# Patient Record
Sex: MALE | Race: BLACK OR AFRICAN AMERICAN | NOT HISPANIC OR LATINO | URBAN - METROPOLITAN AREA
[De-identification: names, ages, dates, MRNs, and addresses within clinical notes are randomized per-mention and may not be internally consistent; named-entity substitution may affect disease eponyms.]

---

## 2019-01-29 ENCOUNTER — APPOINTMENT (OUTPATIENT)
Dept: LAB | Facility: HOSPITAL | Age: 64
End: 2019-01-29
Attending: INTERNAL MEDICINE
Payer: COMMERCIAL

## 2019-01-29 ENCOUNTER — OFFICE VISIT (OUTPATIENT)
Dept: PRIMARY CARE | Facility: CLINIC | Age: 64
End: 2019-01-29
Payer: COMMERCIAL

## 2019-01-29 VITALS
SYSTOLIC BLOOD PRESSURE: 128 MMHG | TEMPERATURE: 98.6 F | HEART RATE: 78 BPM | OXYGEN SATURATION: 98 % | HEIGHT: 67 IN | DIASTOLIC BLOOD PRESSURE: 88 MMHG | WEIGHT: 188 LBS | BODY MASS INDEX: 29.51 KG/M2

## 2019-01-29 DIAGNOSIS — H35.50 HEREDITARY MACULAR DYSTROPHY: ICD-10-CM

## 2019-01-29 DIAGNOSIS — R35.1 BENIGN PROSTATIC HYPERPLASIA WITH NOCTURIA: ICD-10-CM

## 2019-01-29 DIAGNOSIS — Z00.00 ROUTINE GENERAL MEDICAL EXAMINATION AT A HEALTH CARE FACILITY: Primary | ICD-10-CM

## 2019-01-29 DIAGNOSIS — H52.13 MYOPIA OF BOTH EYES: ICD-10-CM

## 2019-01-29 DIAGNOSIS — N40.1 BENIGN PROSTATIC HYPERPLASIA WITH NOCTURIA: ICD-10-CM

## 2019-01-29 DIAGNOSIS — J30.1 SEASONAL ALLERGIC RHINITIS DUE TO POLLEN: ICD-10-CM

## 2019-01-29 DIAGNOSIS — Z00.00 ANNUAL PHYSICAL EXAM: Primary | ICD-10-CM

## 2019-01-29 PROBLEM — D17.1 LIPOMA OF BACK: Status: ACTIVE | Noted: 2019-01-29

## 2019-01-29 LAB
ALBUMIN SERPL-MCNC: 4.4 G/DL (ref 3.4–5)
ALP SERPL-CCNC: 54 IU/L (ref 35–126)
ALT SERPL-CCNC: 14 IU/L (ref 16–63)
ANION GAP SERPL CALC-SCNC: 9 MEQ/L (ref 3–15)
AST SERPL-CCNC: 16 IU/L (ref 15–41)
BILIRUB SERPL-MCNC: 0.6 MG/DL (ref 0.3–1.2)
BUN SERPL-MCNC: 15 MG/DL (ref 8–20)
CALCIUM SERPL-MCNC: 9.4 MG/DL (ref 8.9–10.3)
CHLORIDE SERPL-SCNC: 104 MEQ/L (ref 98–109)
CHOLEST SERPL-MCNC: 188 MG/DL
CO2 SERPL-SCNC: 26 MEQ/L (ref 22–32)
CREAT SERPL-MCNC: 1 MG/DL
ERYTHROCYTE [DISTWIDTH] IN BLOOD BY AUTOMATED COUNT: 13.2 % (ref 11.6–14.4)
GFR SERPL CREATININE-BSD FRML MDRD: >60 ML/MIN/1.73M*2
GLUCOSE SERPL-MCNC: 123 MG/DL (ref 70–99)
HCT VFR BLDCO AUTO: 41 %
HDLC SERPL-MCNC: 39 MG/DL
HDLC SERPL: 4.8 {RATIO}
HGB BLD-MCNC: 13.5 G/DL
LDLC SERPL CALC-MCNC: 123 MG/DL
MCH RBC QN AUTO: 27.4 PG (ref 28–33.2)
MCHC RBC AUTO-ENTMCNC: 32.9 G/DL (ref 32.2–36.5)
MCV RBC AUTO: 83.3 FL (ref 83–98)
NONHDLC SERPL-MCNC: 149 MG/DL
PDW BLD AUTO: 11.4 FL (ref 9.4–12.4)
PLATELET # BLD AUTO: 243 K/UL
POTASSIUM SERPL-SCNC: 4.6 MEQ/L (ref 3.6–5.1)
PROT SERPL-MCNC: 7 G/DL (ref 6–8.2)
RBC # BLD AUTO: 4.92 M/UL (ref 4.5–5.8)
SODIUM SERPL-SCNC: 139 MEQ/L (ref 136–144)
TRIGL SERPL-MCNC: 132 MG/DL (ref 30–149)
WBC # BLD AUTO: 8.22 K/UL

## 2019-01-29 PROCEDURE — 93000 ELECTROCARDIOGRAM COMPLETE: CPT | Performed by: INTERNAL MEDICINE

## 2019-01-29 PROCEDURE — 36415 COLL VENOUS BLD VENIPUNCTURE: CPT

## 2019-01-29 PROCEDURE — 80053 COMPREHEN METABOLIC PANEL: CPT

## 2019-01-29 PROCEDURE — 83036 HEMOGLOBIN GLYCOSYLATED A1C: CPT

## 2019-01-29 PROCEDURE — 99386 PREV VISIT NEW AGE 40-64: CPT | Performed by: INTERNAL MEDICINE

## 2019-01-29 PROCEDURE — 85025 COMPLETE CBC W/AUTO DIFF WBC: CPT

## 2019-01-29 PROCEDURE — 80061 LIPID PANEL: CPT

## 2019-01-29 RX ORDER — TAMSULOSIN HYDROCHLORIDE 0.4 MG/1
CAPSULE ORAL
Refills: 11 | COMMUNITY
Start: 2018-10-25

## 2019-01-29 RX ORDER — FINASTERIDE 5 MG/1
5 TABLET, FILM COATED ORAL DAILY
COMMUNITY

## 2019-01-29 RX ORDER — FLUTICASONE PROPIONATE 50 MCG
1 SPRAY, SUSPENSION (ML) NASAL DAILY
COMMUNITY

## 2019-01-29 RX ORDER — LUTEIN 6 MG
TABLET ORAL
COMMUNITY

## 2019-01-29 ASSESSMENT — ENCOUNTER SYMPTOMS
ABDOMINAL PAIN: 0
LIGHT-HEADEDNESS: 0
DIARRHEA: 0
FATIGUE: 0
CHILLS: 0
NUMBNESS: 0
CONFUSION: 0
FEVER: 0
NAUSEA: 0
VOMITING: 0
HEADACHES: 0
BACK PAIN: 0
DYSURIA: 0
WEAKNESS: 0
SHORTNESS OF BREATH: 0
CONSTIPATION: 0
DIZZINESS: 0
EYE PAIN: 0
TREMORS: 0
SORE THROAT: 0

## 2019-01-29 NOTE — PROGRESS NOTES
"  Subjective     Patient ID: Kvng Garcia is a 63 y.o. male.    Here for physical exam.        Review of Systems   Constitutional: Negative for chills, fatigue and fever.   HENT: Negative for sore throat.    Eyes: Negative for pain.   Respiratory: Negative for shortness of breath.    Cardiovascular: Negative for chest pain.   Gastrointestinal: Negative for abdominal pain, constipation, diarrhea, nausea and vomiting.   Genitourinary: Negative for dysuria.   Musculoskeletal: Negative for back pain.   Skin: Negative for rash.   Neurological: Negative for dizziness, tremors, weakness, light-headedness, numbness and headaches.   Psychiatric/Behavioral: Negative for confusion.       Objective     Vitals:    01/29/19 0919   BP: 128/88   BP Location: Left upper arm   Patient Position: Sitting   Pulse: 78   Temp: 37 °C (98.6 °F)   TempSrc: Oral   SpO2: 98%   Weight: 85.3 kg (188 lb)   Height: 1.702 m (5' 7\")     Body mass index is 29.44 kg/m².    Physical Exam   Constitutional: He is oriented to person, place, and time. He appears well-developed and well-nourished. He is active.   HENT:   Head: Normocephalic and atraumatic.   Right Ear: Tympanic membrane normal.   Left Ear: Tympanic membrane normal.   Nose: Nose normal.   Mouth/Throat: Oropharynx is clear and moist and mucous membranes are normal.   Eyes: EOM are normal. Pupils are equal, round, and reactive to light.   Neck: Trachea normal and normal range of motion. Neck supple. Carotid bruit is not present. No thyroid mass present.   Cardiovascular: Normal rate, regular rhythm, normal heart sounds and intact distal pulses.    Pulmonary/Chest: Effort normal and breath sounds normal. He has no wheezes. He has no rales.   Abdominal: Soft. Bowel sounds are normal. He exhibits no distension. There is no tenderness.   Genitourinary:   Genitourinary Comments: Pt had JUSTIN w  9/2018   Musculoskeletal: Normal range of motion. He exhibits no edema, tenderness or deformity. "   Neurological: He is alert and oriented to person, place, and time. He displays normal reflexes. No cranial nerve deficit.   Skin: Skin is warm and dry. No rash noted. No erythema.   Approx 4 cm soft moveable SQ mass left upper back.    Psychiatric: He has a normal mood and affect. His behavior is normal. Judgment and thought content normal.   Nursing note and vitals reviewed.      Assessment/Plan   Problem List Items Addressed This Visit     Hereditary macular dystrophy     Noted. Left eye. Cont f/u optho, as doing.          Myopia     Noted. Left eye. Cont f/u optho, as doing.          Benign prostatic hyperplasia with nocturia     Moderate. Cont current meds. Minimize fluids at hs         Relevant Medications    tamsulosin (FLOMAX) 0.4 mg capsule    finasteride (PROSCAR) 5 mg tablet    Seasonal allergic rhinitis due to pollen     Mild. Flonase seasonally. Rx prn.          Relevant Orders    Comprehensive metabolic panel      Other Visit Diagnoses     Annual physical exam    -  Primary    Relevant Orders    ECG 12 LEAD OFFICE PERFORMED (Completed)    CBC    Lipid panel    Direct Access Colonoscopy [facility]

## 2019-01-30 ENCOUNTER — TELEPHONE (OUTPATIENT)
Dept: PRIMARY CARE | Facility: CLINIC | Age: 64
End: 2019-01-30

## 2019-01-30 LAB
EST. AVERAGE GLUCOSE BLD GHB EST-MCNC: 151 MG/DL
HBA1C MFR BLD HPLC: 6.9 %

## 2019-01-31 ENCOUNTER — TELEPHONE (OUTPATIENT)
Dept: PRIMARY CARE | Facility: CLINIC | Age: 64
End: 2019-01-31

## 2019-01-31 NOTE — TELEPHONE ENCOUNTER
----- Message from Moy العلي MD sent at 1/31/2019  8:39 AM EST -----  A1C is high. C/w Dx of DM. Come in to discuss.

## 2019-02-07 ENCOUNTER — OFFICE VISIT (OUTPATIENT)
Dept: PRIMARY CARE | Facility: CLINIC | Age: 64
End: 2019-02-07
Payer: COMMERCIAL

## 2019-02-07 VITALS
TEMPERATURE: 99 F | HEIGHT: 67 IN | WEIGHT: 185 LBS | BODY MASS INDEX: 29.03 KG/M2 | OXYGEN SATURATION: 98 % | HEART RATE: 72 BPM | DIASTOLIC BLOOD PRESSURE: 80 MMHG | SYSTOLIC BLOOD PRESSURE: 140 MMHG | RESPIRATION RATE: 17 BRPM

## 2019-02-07 DIAGNOSIS — H52.13 MYOPIA OF BOTH EYES: ICD-10-CM

## 2019-02-07 DIAGNOSIS — R35.1 BENIGN PROSTATIC HYPERPLASIA WITH NOCTURIA: ICD-10-CM

## 2019-02-07 DIAGNOSIS — N40.1 BENIGN PROSTATIC HYPERPLASIA WITH NOCTURIA: ICD-10-CM

## 2019-02-07 DIAGNOSIS — E11.9 TYPE 2 DIABETES MELLITUS WITHOUT COMPLICATION, WITHOUT LONG-TERM CURRENT USE OF INSULIN (CMS/HCC): Primary | ICD-10-CM

## 2019-02-07 PROCEDURE — 99214 OFFICE O/P EST MOD 30 MIN: CPT | Performed by: INTERNAL MEDICINE

## 2019-02-07 ASSESSMENT — ENCOUNTER SYMPTOMS
HEADACHES: 0
ABDOMINAL PAIN: 0
SHORTNESS OF BREATH: 0
FATIGUE: 0

## 2019-02-07 NOTE — ASSESSMENT & PLAN NOTE
New Dx. A1C 6.9 1/29/19.   Diet discussed at length.  Needs to exercise.   Doesn't want meds at this time. Wants to try to eat right and exercise and recheck labs in 3 mos. If still high, t/c meds.

## 2019-02-07 NOTE — PROGRESS NOTES
"  Subjective     Patient ID: Kvng Garcia is a 63 y.o. male.    DM2: new diagnosis. A1C 6.9 on 1/29/19.   Denies increased urination.   Denies change vision. Saw optho last year. Has another appt 4/2019.  Diet: fair. Candy, soda occasionally.   Exercise: none          Review of Systems   Constitutional: Negative for fatigue.   Respiratory: Negative for shortness of breath.    Cardiovascular: Negative for chest pain.   Gastrointestinal: Negative for abdominal pain.   Neurological: Negative for headaches.       Objective     Vitals:    02/07/19 1206   BP: 140/80   BP Location: Left upper arm   Patient Position: Sitting   Pulse: 72   Resp: 17   Temp: 37.2 °C (99 °F)   SpO2: 98%   Weight: 83.9 kg (185 lb)   Height: 1.702 m (5' 7\")     Body mass index is 28.98 kg/m².    Physical Exam   Constitutional: He is oriented to person, place, and time. He appears well-developed and well-nourished. No distress.   Cardiovascular: Normal rate, regular rhythm, normal heart sounds and intact distal pulses.    No murmur heard.  Pulmonary/Chest: Effort normal and breath sounds normal. No respiratory distress. He has no wheezes. He has no rales.   Abdominal: Soft. Bowel sounds are normal. He exhibits no distension. There is no tenderness.   Neurological: He is alert and oriented to person, place, and time.   Skin: Skin is warm and dry. He is not diaphoretic.       Assessment/Plan   Problem List Items Addressed This Visit     Myopia     Noted. Cont f/u optho for current eye problems and for Dx of DM. Pt aware.          Benign prostatic hyperplasia with nocturia     Stable.  Continue current medications.         Type 2 diabetes mellitus without complication, without long-term current use of insulin (CMS/[facility]) - Primary     New Dx. A1C 6.9 1/29/19.   Diet discussed at length.  Needs to exercise.   Doesn't want meds at this time. Wants to try to eat right and exercise and recheck labs in 3 mos. If still high, t/c meds.          Relevant " Orders    Basic metabolic panel    Hemoglobin A1c    Microalbumin/Creatinine Ur Random

## 2019-07-30 ENCOUNTER — OFFICE VISIT (OUTPATIENT)
Dept: PRIMARY CARE | Facility: CLINIC | Age: 64
End: 2019-07-30
Payer: COMMERCIAL

## 2019-07-30 ENCOUNTER — APPOINTMENT (OUTPATIENT)
Dept: LAB | Facility: HOSPITAL | Age: 64
End: 2019-07-30
Attending: INTERNAL MEDICINE
Payer: COMMERCIAL

## 2019-07-30 ENCOUNTER — TELEPHONE (OUTPATIENT)
Dept: PRIMARY CARE | Facility: CLINIC | Age: 64
End: 2019-07-30

## 2019-07-30 VITALS
DIASTOLIC BLOOD PRESSURE: 80 MMHG | HEART RATE: 75 BPM | BODY MASS INDEX: 29 KG/M2 | SYSTOLIC BLOOD PRESSURE: 118 MMHG | OXYGEN SATURATION: 98 % | RESPIRATION RATE: 14 BRPM | HEIGHT: 67 IN | WEIGHT: 184.8 LBS | TEMPERATURE: 98 F

## 2019-07-30 DIAGNOSIS — J30.1 SEASONAL ALLERGIC RHINITIS DUE TO POLLEN: ICD-10-CM

## 2019-07-30 DIAGNOSIS — N40.1 BENIGN PROSTATIC HYPERPLASIA WITH NOCTURIA: ICD-10-CM

## 2019-07-30 DIAGNOSIS — E11.9 TYPE 2 DIABETES MELLITUS WITHOUT COMPLICATION, WITHOUT LONG-TERM CURRENT USE OF INSULIN (CMS/HCC): Primary | ICD-10-CM

## 2019-07-30 DIAGNOSIS — E11.9 TYPE 2 DIABETES MELLITUS WITHOUT COMPLICATION, WITHOUT LONG-TERM CURRENT USE OF INSULIN (CMS/HCC): ICD-10-CM

## 2019-07-30 DIAGNOSIS — R35.1 BENIGN PROSTATIC HYPERPLASIA WITH NOCTURIA: ICD-10-CM

## 2019-07-30 LAB
ANION GAP SERPL CALC-SCNC: 7 MEQ/L (ref 3–15)
BUN SERPL-MCNC: 19 MG/DL (ref 8–20)
CALCIUM SERPL-MCNC: 9.2 MG/DL (ref 8.9–10.3)
CHLORIDE SERPL-SCNC: 108 MEQ/L (ref 98–109)
CO2 SERPL-SCNC: 26 MEQ/L (ref 22–32)
CREAT SERPL-MCNC: 1.2 MG/DL
EST. AVERAGE GLUCOSE BLD GHB EST-MCNC: 146 MG/DL
GFR SERPL CREATININE-BSD FRML MDRD: >60 ML/MIN/1.73M*2
GLUCOSE SERPL-MCNC: 114 MG/DL (ref 70–99)
HBA1C MFR BLD HPLC: 6.7 %
HCV AB SER QL: NONREACTIVE
POTASSIUM SERPL-SCNC: 4.4 MEQ/L (ref 3.6–5.1)
SODIUM SERPL-SCNC: 141 MEQ/L (ref 136–144)

## 2019-07-30 PROCEDURE — 83036 HEMOGLOBIN GLYCOSYLATED A1C: CPT

## 2019-07-30 PROCEDURE — 99214 OFFICE O/P EST MOD 30 MIN: CPT | Performed by: INTERNAL MEDICINE

## 2019-07-30 PROCEDURE — 82310 ASSAY OF CALCIUM: CPT

## 2019-07-30 PROCEDURE — 86803 HEPATITIS C AB TEST: CPT

## 2019-07-30 PROCEDURE — 36415 COLL VENOUS BLD VENIPUNCTURE: CPT

## 2019-07-30 ASSESSMENT — ENCOUNTER SYMPTOMS
DIABETIC ASSOCIATED SYMPTOMS: 0
ABDOMINAL PAIN: 0
SHORTNESS OF BREATH: 0
HEADACHES: 0
FATIGUE: 0

## 2019-07-30 NOTE — TELEPHONE ENCOUNTER
Called to inform patient of negative lab results no answer.  Per Office PHI Left message with results and advised patient to give office a call back with any questions or concerns. Marylin Coleman MA    ----- Message from Moy العلي MD sent at 7/30/2019  2:13 PM EDT -----  Negative.

## 2019-07-30 NOTE — PROGRESS NOTES
A Bit better, but still high. Diet and exercise.   Pt doesn't want meds but may need medication, as we discussed.   Recheck A1C 3-6 months.

## 2019-07-30 NOTE — ASSESSMENT & PLAN NOTE
Pt doing better with diet. Exercising regularly.   Check labs and proceed accordingly.   Doesn't want meds up to this point.

## 2019-07-30 NOTE — PROGRESS NOTES
"  Subjective     Patient ID: Kvng Garcia is a 64 y.o. male.    Diet: Changed diet. \"I don't eat any junk. I don't eat candy\". Increased water.  Exercise: Walking, cut grass. Went to gym a few times. Bought treadmill and has been using it about 2x/week.       Diabetes   He presents for his follow-up diabetic visit. He has type 2 diabetes mellitus. His disease course has been stable. There are no hypoglycemic associated symptoms. Pertinent negatives for hypoglycemia include no headaches. There are no diabetic associated symptoms. Pertinent negatives for diabetes include no chest pain and no fatigue. Current diabetic treatment includes diet.       Review of Systems   Constitutional: Negative for fatigue.   Respiratory: Negative for shortness of breath.    Cardiovascular: Negative for chest pain.   Gastrointestinal: Negative for abdominal pain.   Neurological: Negative for headaches.       Objective     Vitals:    07/30/19 0814   BP: 118/80   BP Location: Right upper arm   Patient Position: Sitting   Pulse: 75   Resp: 14   Temp: 36.7 °C (98 °F)   TempSrc: Oral   SpO2: 98%   Weight: 83.8 kg (184 lb 12.8 oz)   Height: 1.702 m (5' 7\")     Body mass index is 28.94 kg/m².    Physical Exam   Constitutional: He is oriented to person, place, and time. He appears well-developed and well-nourished. No distress.   Cardiovascular: Normal rate, regular rhythm, normal heart sounds and intact distal pulses.    No murmur heard.  Pulmonary/Chest: Effort normal and breath sounds normal. No respiratory distress. He has no wheezes. He has no rales.   Abdominal: Soft. Bowel sounds are normal. He exhibits no distension. There is no tenderness.   Neurological: He is alert and oriented to person, place, and time.   Skin: Skin is warm and dry. He is not diaphoretic.       Assessment/Plan   Diagnoses and all orders for this visit:    Type 2 diabetes mellitus without complication, without long-term current use of insulin (CMS/[facility]) " (Primary)  Assessment & Plan:  Pt doing better with diet. Exercising regularly.   Check labs and proceed accordingly.   Doesn't want meds up to this point.     Orders:  -     Basic metabolic panel; Future  -     Hemoglobin A1c; Future  -     Hepatitis C antibody; Future    Benign prostatic hyperplasia with nocturia  Assessment & Plan:  Stable.  Continue current medications.  Pt notes he is seeing  next week for JUSTIN.       Seasonal allergic rhinitis due to pollen  Assessment & Plan:  Stable. Mild.   Continue current medications.

## 2019-07-30 NOTE — TELEPHONE ENCOUNTER
Called to inform patient of results no answer left message for patient to give office a call back. Marylin Coleman MA    ----- Message from Moy العلي MD sent at 7/30/2019 11:16 AM EDT -----  A Bit better, but still high. Diet and exercise.   Pt doesn't want meds but may need medication, as we discussed.   Recheck A1C 3-6 months.

## 2019-07-31 ENCOUNTER — PATIENT OUTREACH (OUTPATIENT)
Dept: PRIMARY CARE | Facility: CLINIC | Age: 64
End: 2019-07-31

## 2019-07-31 DIAGNOSIS — Z12.11 SCREEN FOR COLON CANCER: Primary | ICD-10-CM

## 2019-07-31 NOTE — PROGRESS NOTES
Care Gap Team has outreached to [facility] on behalf of their primary care provider.      Care Gap Source:: [employer]  Gap Report         Care Gap Status:: Overdue    Outreach via:: Telephone    Adult Preventive Wellness Protocol(s) Used: : Colorectal Cancer Screening    Chart Review Completed:: Yes  Patient interview completed:: No  Inclusion Criteria:: Met  Exclusion Criteria: None  Patient educated on recommended care:: No       Provided order(s) for:: none    Provided clinical referral(s) for:: None    Appointment provided:: No                Called pt n/a left vm in regards to pt preventative health care screenings. I asked pt to please call me back at their earliest convenience.     Pt left a vm returning my call.     Spoke with pt, he states he didn't want to have a colonoscopy, however he would complete a FIT Kit. I will mail the Kit to his home.

## 2019-08-16 ENCOUNTER — LAB REQUISITION (OUTPATIENT)
Dept: LAB | Facility: HOSPITAL | Age: 64
End: 2019-08-16
Attending: INTERNAL MEDICINE
Payer: COMMERCIAL

## 2019-08-16 DIAGNOSIS — Z12.11 ENCOUNTER FOR SCREENING FOR MALIGNANT NEOPLASM OF COLON: ICD-10-CM

## 2019-08-16 PROCEDURE — 82274 ASSAY TEST FOR BLOOD FECAL: CPT | Performed by: INTERNAL MEDICINE

## 2019-08-19 LAB — HEMOCCULT STL QL IA: NEGATIVE

## 2019-08-20 ENCOUNTER — TELEPHONE (OUTPATIENT)
Dept: PRIMARY CARE | Facility: CLINIC | Age: 64
End: 2019-08-20

## 2019-09-15 ENCOUNTER — HOSPITAL ENCOUNTER (EMERGENCY)
Facility: HOSPITAL | Age: 64
Discharge: HOME | End: 2019-09-15
Attending: EMERGENCY MEDICINE
Payer: COMMERCIAL

## 2019-09-15 VITALS
HEIGHT: 67 IN | OXYGEN SATURATION: 96 % | BODY MASS INDEX: 28.88 KG/M2 | RESPIRATION RATE: 16 BRPM | WEIGHT: 184 LBS | DIASTOLIC BLOOD PRESSURE: 88 MMHG | SYSTOLIC BLOOD PRESSURE: 156 MMHG | TEMPERATURE: 98.7 F | HEART RATE: 82 BPM

## 2019-09-15 DIAGNOSIS — K08.89 PAIN, DENTAL: Primary | ICD-10-CM

## 2019-09-15 PROCEDURE — 99281 EMR DPT VST MAYX REQ PHY/QHP: CPT

## 2019-09-15 RX ORDER — IBUPROFEN 800 MG/1
800 TABLET ORAL EVERY 6 HOURS PRN
Qty: 30 TABLET | Refills: 0 | Status: SHIPPED | OUTPATIENT
Start: 2019-09-15 | End: 2019-09-15

## 2019-09-15 RX ORDER — PENICILLIN V POTASSIUM 500 MG/1
500 TABLET, FILM COATED ORAL 3 TIMES DAILY
Qty: 21 TABLET | Refills: 0 | Status: SHIPPED | OUTPATIENT
Start: 2019-09-15 | End: 2019-09-22

## 2019-09-15 RX ORDER — IBUPROFEN 800 MG/1
800 TABLET ORAL EVERY 6 HOURS PRN
Qty: 30 TABLET | Refills: 0 | Status: SHIPPED | OUTPATIENT
Start: 2019-09-15 | End: 2019-09-25

## 2019-09-15 ASSESSMENT — ENCOUNTER SYMPTOMS
FEVER: 0
CHILLS: 0

## 2019-09-15 NOTE — ED PROVIDER NOTES
"HPI     Chief Complaint   Patient presents with   • Dental Pain       HPI       62-year-old male presents for evaluation of 2 days of right lower tooth pain states that he is scheduled to have some dental work done but has not rescheduled for insurance reasons did not phone his dentist.  Has taken 1 dose of ibuprofen no fevers no trouble swallowing     Patient History     Past Medical History:   Diagnosis Date   • BPH (benign prostatic hyperplasia)    • Hair loss        History reviewed. No pertinent surgical history.    History reviewed. No pertinent family history.    Social History   Substance Use Topics   • Smoking status: Never Smoker   • Smokeless tobacco: Never Used   • Alcohol use Yes      Comment: Occasionally       Systems Reviewed from Nursing Triage:          Review of Systems     Review of Systems   Constitutional: Negative for chills and fever.   HENT: Positive for dental problem.         Physical Exam     ED Triage Vitals [09/15/19 0904]   Temp Heart Rate Resp BP SpO2   37.1 °C (98.7 °F) 82 16 (!) 156/88 96 %      Temp Source Heart Rate Source Patient Position BP Location FiO2 (%) (Set)   Oral -- -- -- --                     Patient Vitals for the past 24 hrs:   BP Temp Temp src Pulse Resp SpO2 Height Weight   09/15/19 0905 - - - - - - 1.702 m (5' 7\") 83.5 kg (184 lb)   09/15/19 0904 (!) 156/88 37.1 °C (98.7 °F) Oral 82 16 96 % - -           Physical Exam   Constitutional: He appears well-developed and well-nourished.   HENT:   Head: Normocephalic and atraumatic.   right lower jaw decayed tooth mild ttp no sts ,no abscess     Eyes: Conjunctivae are normal.   Neck: Neck supple.   Cardiovascular: Normal rate and regular rhythm.    No murmur heard.  Pulmonary/Chest: Effort normal and breath sounds normal. No respiratory distress.   Abdominal: Soft. There is no tenderness.   Musculoskeletal: He exhibits no edema.   Neurological: He is alert.   Skin: Skin is warm and dry.   Psychiatric: He has a normal " mood and affect.   Nursing note and vitals reviewed.           Procedures    ED Course & MDM     Labs Reviewed - No data to display    No orders to display               MDM         Clinical Impressions as of Sep 15 1816   Pain, dental        My Garcias PA C  09/15/19 1823

## 2019-09-15 NOTE — ED ATTESTATION NOTE
The patient was evaluated and managed by the physician assistant.    DO Lexy Coombs Steven K, DO  09/15/19 3097

## 2019-09-15 NOTE — DISCHARGE INSTRUCTIONS
Please follow up as directed to obtain any final results and review your plan of care. CALL your primary doctor's office today to schedule a follow up appointment within the next 48 hours.         REVIEW AND DISCUSS ALL OF YOUR MEDICATIONS WITH YOUR PRIMARY CARE TEAM WITHIN THE NEXT 2 DAYS, even ones that you may have been on for a long time.        Radiology review of your studies (XRAYs, CT Scans, Ultrasounds, MRI scans) may still be pending. Preliminary results may be available today but final written reports may not be available until tomorrow. Important findings may be included in the final radiology review.         If instructed please CALL the Emergency Department at [phone] to obtain the final results within the next 24 hours. Review the final results of all of your tests with your primary care doctor within the next 2 days.    Cultures and uncommon labs may take 2 days or more before results are available. Please ask about all pending tests until the final results are known. You may not be notified of important test results unless you ask for these when you call or when you follow up.        Please call or visit your primary doctor or return to this Emergency Department (or the closest Emergency Department) if you have new concerns, if you are not getting better, or if  you feel that you are getting worse.

## 2020-01-07 ENCOUNTER — DOCUMENTATION (OUTPATIENT)
Dept: PRIMARY CARE | Facility: CLINIC | Age: 65
End: 2020-01-07

## 2020-05-04 ENCOUNTER — TELEPHONE (OUTPATIENT)
Dept: PRIMARY CARE | Facility: CLINIC | Age: 65
End: 2020-05-04

## 2020-05-04 ENCOUNTER — TELEMEDICINE (OUTPATIENT)
Dept: PRIMARY CARE | Facility: CLINIC | Age: 65
End: 2020-05-04
Payer: COMMERCIAL

## 2020-05-04 DIAGNOSIS — S22.32XA CLOSED FRACTURE OF ONE RIB OF LEFT SIDE, INITIAL ENCOUNTER: ICD-10-CM

## 2020-05-04 DIAGNOSIS — E11.9 TYPE 2 DIABETES MELLITUS WITHOUT COMPLICATION, WITHOUT LONG-TERM CURRENT USE OF INSULIN (CMS/HCC): Primary | ICD-10-CM

## 2020-05-04 PROCEDURE — 99442 PR PHYS/QHP TELEPHONE EVALUATION 11-20 MIN: CPT | Performed by: INTERNAL MEDICINE

## 2020-05-04 RX ORDER — INSULIN PUMP SYRINGE, 3 ML
EACH MISCELLANEOUS
Qty: 1 EACH | Refills: 0 | Status: SHIPPED | OUTPATIENT
Start: 2020-05-04 | End: 2020-05-04 | Stop reason: SDUPTHER

## 2020-05-04 RX ORDER — LANCETS
EACH MISCELLANEOUS
Qty: 3 EACH | Refills: 0 | Status: SHIPPED | OUTPATIENT
Start: 2020-05-04 | End: 2020-05-04 | Stop reason: SDUPTHER
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
## 2020-05-04 NOTE — TELEPHONE ENCOUNTER
WHO: PT called    WHAT: Request for medical equipment    WHEN: N/A    WHERE: N/A    WHY: PT stated he needs a glucometer machine and the accessories

## 2020-05-05 RX ORDER — INSULIN PUMP SYRINGE, 3 ML
EACH MISCELLANEOUS
Qty: 1 EACH | Refills: 0 | Status: SHIPPED | OUTPATIENT
Start: 2020-05-05 | End: 2020-05-06 | Stop reason: SDUPTHER

## 2020-05-05 RX ORDER — LANCETS
EACH MISCELLANEOUS
Qty: 3 EACH | Refills: 0 | Status: SHIPPED | OUTPATIENT
Start: 2020-05-05 | End: 2020-05-06 | Stop reason: SDUPTHER

## 2020-05-06 ENCOUNTER — TELEPHONE (OUTPATIENT)
Dept: PRIMARY CARE | Facility: CLINIC | Age: 65
End: 2020-05-06

## 2020-05-06 RX ORDER — INSULIN PUMP SYRINGE, 3 ML
EACH MISCELLANEOUS
Qty: 1 EACH | Refills: 0 | Status: SHIPPED | OUTPATIENT
Start: 2020-05-06 | End: 2021-05-05

## 2020-05-06 RX ORDER — LANCETS
EACH MISCELLANEOUS
Qty: 3 EACH | Refills: 0 | Status: SHIPPED | OUTPATIENT
Start: 2020-05-06

## 2020-05-06 NOTE — TELEPHONE ENCOUNTER
Patient called in stating the pharmacy has some incorrect information regarding his need for the glucometer. Patient is stating that he does not have diabetes. Patient is requesting that the office call the pharmacy to straighten this out.

## 2020-05-11 ENCOUNTER — TELEPHONE (OUTPATIENT)
Dept: PRIMARY CARE | Facility: CLINIC | Age: 65
End: 2020-05-11

## 2020-05-11 NOTE — TELEPHONE ENCOUNTER
WHO: PT called    WHAT: Request for medication     WHEN: N/A    WHERE: N/A    WHY: PT stated they have swelling in their tooth and are unable to reach the dentist they went to, to receive an antibiotic, pt would like to know if pcp can give them a script
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
## 2020-05-18 ENCOUNTER — DOCUMENTATION (OUTPATIENT)
Dept: PRIMARY CARE | Facility: CLINIC | Age: 65
End: 2020-05-18

## 2020-05-18 NOTE — PROGRESS NOTES
Koffi Acosta MA has completed a chart review for [place] Garcia and have determined that the care gap has been satisfied.    Care Gap Source:: [employer]    Care Gap(s) Identified:: Diabetic Hemoglobin A1c    Chart Review Completed:: Yes

## 2020-08-28 ENCOUNTER — DOCUMENTATION (OUTPATIENT)
Dept: PRIMARY CARE | Facility: CLINIC | Age: 65
End: 2020-08-28

## 2020-08-28 DIAGNOSIS — Z12.11 SCREEN FOR COLON CANCER: Primary | ICD-10-CM

## 2020-09-14 ENCOUNTER — TELEMEDICINE (OUTPATIENT)
Dept: PRIMARY CARE | Facility: CLINIC | Age: 65
End: 2020-09-14
Payer: COMMERCIAL

## 2020-09-14 DIAGNOSIS — R35.1 BENIGN PROSTATIC HYPERPLASIA WITH NOCTURIA: ICD-10-CM

## 2020-09-14 DIAGNOSIS — J30.1 SEASONAL ALLERGIC RHINITIS DUE TO POLLEN: ICD-10-CM

## 2020-09-14 DIAGNOSIS — N40.1 BENIGN PROSTATIC HYPERPLASIA WITH NOCTURIA: ICD-10-CM

## 2020-09-14 DIAGNOSIS — E11.9 TYPE 2 DIABETES MELLITUS WITHOUT COMPLICATION, WITHOUT LONG-TERM CURRENT USE OF INSULIN (CMS/HCC): Primary | ICD-10-CM

## 2020-09-14 PROCEDURE — 99442 PR PHYS/QHP TELEPHONE EVALUATION 11-20 MIN: CPT | Performed by: INTERNAL MEDICINE

## 2020-09-14 NOTE — PROGRESS NOTES
Verification of Patient Location:  The patient affirms they are currently located in the following state:[place]     Request for Consent:  You and I are about to have a telemedicine check-in or visit. This is allowed because you are already my patient, and you have requested it.  This telemedicine visit will be billed to your health insurance or you, if you are self-insured.  You understand you will be responsible for any copayments or coinsurances that apply to your telemedicine visit.  Before starting our telemedicine visit, I am required to get your consent for this virtual check-in or visit by telemedicine. Do you consent?      Patient Response to Request for Consent: Yes    The following have been reviewed and updated as appropriate in this visit:         Visit Documentation:    Pt living to [place] now.   Going to get new doctor there.     DM2:   Pt notes BSs better.   Stable. Cont current rx. Diet     BPH: stable Cont current meds.    Allergies: stable. Rx prn.               Time Spent in Medical Discussion During This Encounter:  15 minutes

## 2020-09-16 ENCOUNTER — DOCUMENTATION (OUTPATIENT)
Dept: PRIMARY CARE | Facility: CLINIC | Age: 65
End: 2020-09-16

## 2020-09-16 NOTE — PROGRESS NOTES
Care Gap Team has outreached to [facility] on behalf of their primary care provider.      Care Gap Source:: [employer]                        Chart Review Completed:: Yes         Patient is apart of the FIT KIT Initiative. I intended to contact the patient in regards to whether, or not the KIT was received. I reviewed the patient's chart, and noticed that the patient had a recent office visit on 9/14/2020, and per the providers note, it states the patient is moving to [place], and will continue care there. I will not outreach this patient.

## 2020-10-07 ENCOUNTER — PATIENT OUTREACH (OUTPATIENT)
Dept: PRIMARY CARE | Facility: CLINIC | Age: 65
End: 2020-10-07

## 2020-10-07 NOTE — PROGRESS NOTES
Care Gap Team has outreached to [facility] on behalf of their primary care provider.      Care Gap Source:: [employer]         Care Gap Status:: Due    Outreach via:: Telephone    Adult Preventive Wellness Protocol(s) Used: : Diabetic Retinopathy Screening, HbA1c Completion, Diabetic Nephropathy Screening    Chart Review Completed:: Yes  Patient interview completed:: Yes  Inclusion Criteria:: Met  Exclusion Criteria: None  Patient educated on recommended care:: Yes                 Appointment provided:: No         Upon review of patient chart it is noted at his 9/14/2020 office visit with pcp that patient is moving to [place]. Called patient to confirm and he states he moved to [place] a few weeks ago. No out reach provided.

## 2020-10-27 ENCOUNTER — OFFICE VISIT (OUTPATIENT)
Dept: URBAN - METROPOLITAN AREA CLINIC 17 | Facility: CLINIC | Age: 65
End: 2020-10-27
Payer: MEDICARE

## 2020-10-27 DIAGNOSIS — H25.13 AGE-RELATED NUCLEAR CATARACT, BILATERAL: ICD-10-CM

## 2020-10-27 DIAGNOSIS — H35.52 PIGMENTARY RETINAL DYSTROPHY: Primary | ICD-10-CM

## 2020-10-27 PROCEDURE — 92004 COMPRE OPH EXAM NEW PT 1/>: CPT | Performed by: OPHTHALMOLOGY

## 2020-10-27 PROCEDURE — 92134 CPTRZ OPH DX IMG PST SGM RTA: CPT | Performed by: OPHTHALMOLOGY

## 2020-10-27 ASSESSMENT — INTRAOCULAR PRESSURE
OD: 15
OS: 13

## 2020-10-27 NOTE — IMPRESSION/PLAN
Impression: Age-related nuclear cataract, bilateral: H25.13. Plan: Moderate NS/CC OU -- explained CE likely wouldn't help clarity given retinal disease but he is interested in trying anyways Will refer to cataract specialist for eval

## 2020-10-27 NOTE — IMPRESSION/PLAN
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 Plan: About a decade of poor VA OU -- sister had VA loss at an early age Has been extensively worked up at [facility] in [place] where he's from No recent changes per patient OCT shows atrophy no edema Will connect him with low vision Recheck here 6m OCT/exam

## 2021-02-08 ENCOUNTER — OFFICE VISIT (OUTPATIENT)
Dept: URBAN - METROPOLITAN AREA CLINIC 17 | Facility: CLINIC | Age: 66
End: 2021-02-08
Payer: MEDICARE

## 2021-02-08 PROCEDURE — 99213 OFFICE O/P EST LOW 20 MIN: CPT | Performed by: OPHTHALMOLOGY

## 2021-02-08 ASSESSMENT — INTRAOCULAR PRESSURE
OD: 13
OS: 11

## 2021-02-08 ASSESSMENT — VISUAL ACUITY
OS: 20/300
OD: 20/300

## 2021-02-08 ASSESSMENT — KERATOMETRY
OS: 45.50
OD: 45.88

## 2021-02-08 NOTE — IMPRESSION/PLAN
Impression: Age-related nuclear cataract, bilateral: H25.13. Condition: established, worsening. Plan: Discussed diagnosis in detail with patient. No surgical treatment currently recommended. The patient  has a known retinal disorder and is aware vision may not improve much with cataract surgery. Would expect some brightness from doing surgery but advised pt I do not believe he will improve on the chart seeing.

## 2021-02-08 NOTE — IMPRESSION/PLAN
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 Plan: About a decade of poor VA OU -- sister had VA loss at an early age. Has been extensively worked up at [facility] in [place] where he's from. No recent changes per patient. Will connect him with low vision Pt is interest in meeting with low vision specialist and seeing if there is any instruments that patient can benefit from. Pt had some but was unable to bring with him from [place].

## 2021-03-10 ENCOUNTER — TELEPHONE (OUTPATIENT)
Dept: PRIMARY CARE | Facility: CLINIC | Age: 66
End: 2021-03-10

## 2021-03-11 NOTE — TELEPHONE ENCOUNTER
Spoke to the patient and he is in [place] and when he comes back he will call and make an appointment

## 2021-12-17 ENCOUNTER — DOCUMENTATION (OUTPATIENT)
Dept: PRIMARY CARE | Facility: CLINIC | Age: 66
End: 2021-12-17
Payer: COMMERCIAL

## 2021-12-17 NOTE — PROGRESS NOTES
Care Gap Team has not outreached to \[facility]\"" on behalf of their primary care provider.      Care Gap Source: [employer]    Care Gap(s) Identified: Colorectal Cancer Screening,Diabetic Nephropathy Screening     Patient was outreached on 10/7/20, patient stated moved to [place], and is no longer under Dr. Tom care. No outreach preformed at this time.

## 2022-09-13 ENCOUNTER — OFFICE VISIT (OUTPATIENT)
Dept: URBAN - METROPOLITAN AREA CLINIC 17 | Facility: CLINIC | Age: 67
End: 2022-09-13
Payer: MEDICARE

## 2022-09-13 DIAGNOSIS — H35.52 PIGMENTARY RETINAL DYSTROPHY: Primary | ICD-10-CM

## 2022-09-13 DIAGNOSIS — H25.13 AGE-RELATED NUCLEAR CATARACT, BILATERAL: ICD-10-CM

## 2022-09-13 PROCEDURE — 92134 CPTRZ OPH DX IMG PST SGM RTA: CPT | Performed by: OPHTHALMOLOGY

## 2022-09-13 PROCEDURE — 92014 COMPRE OPH EXAM EST PT 1/>: CPT | Performed by: OPHTHALMOLOGY

## 2022-09-13 ASSESSMENT — INTRAOCULAR PRESSURE
OD: 12
OS: 12

## 2022-09-13 NOTE — IMPRESSION/PLAN
Impression: Age-related nuclear cataract, bilateral: H25.13. Plan: Moderate NS/CC OU -- explained CE likely wouldn't help clarity given retinal disease but he is interested in trying anyways  Follow up with Dr Celestine Ivy

## 2022-09-13 NOTE — IMPRESSION/PLAN
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 Plan: NO changes -- VA /exam about the same About a decade of poor VA OU -- sister had VA loss at an early age Has been extensively worked up at [facility] in [place] where he's from No recent changes per patient OCT shows atrophy no edema Will connect him with low vision Discussed condition/plan with patient. 

Recheck here 6m OCT/exam

## 2023-09-12 ENCOUNTER — OFFICE VISIT (OUTPATIENT)
Dept: URBAN - METROPOLITAN AREA CLINIC 17 | Facility: CLINIC | Age: 68
End: 2023-09-12
Payer: MEDICARE

## 2023-09-12 DIAGNOSIS — H25.13 AGE-RELATED NUCLEAR CATARACT, BILATERAL: ICD-10-CM

## 2023-09-12 DIAGNOSIS — H35.52 PIGMENTARY RETINAL DYSTROPHY: Primary | ICD-10-CM

## 2023-09-12 DIAGNOSIS — H43.813 VITREOUS DEGENERATION, BILATERAL: ICD-10-CM

## 2023-09-12 PROCEDURE — 92014 COMPRE OPH EXAM EST PT 1/>: CPT | Performed by: OPHTHALMOLOGY

## 2023-09-12 PROCEDURE — 92134 CPTRZ OPH DX IMG PST SGM RTA: CPT | Performed by: OPHTHALMOLOGY

## 2023-09-12 ASSESSMENT — INTRAOCULAR PRESSURE
OD: 14
OS: 14

## 2025-03-21 NOTE — TELEPHONE ENCOUNTER
Problem: At Risk for Falls  Goal: Patient does not fall  Outcome: Monitoring/Evaluating progress  Goal: Patient takes action to control fall-related risks  Outcome: Monitoring/Evaluating progress     Problem: At Risk for Injury Due to Fall  Goal: Patient does not fall  Outcome: Monitoring/Evaluating progress  Goal: Takes action to control condition specific risks  Outcome: Monitoring/Evaluating progress  Goal: Verbalizes understanding of fall-related injury personal risks  Description: Document education using the patient education activity  Outcome: Monitoring/Evaluating progress     Problem: Pain  Goal: Acceptable pain level achieved/maintained at rest using appropriate pain scale for the patient  Outcome: Monitoring/Evaluating progress  Goal: Acceptable pain level achieved/maintained with activity using appropriate pain scale for the patient  Outcome: Monitoring/Evaluating progress  Goal: Acceptable pain level achieved/maintained without oversedation  Outcome: Monitoring/Evaluating progress      Notified pt of FIT test results